# Patient Record
Sex: MALE | Race: WHITE
[De-identification: names, ages, dates, MRNs, and addresses within clinical notes are randomized per-mention and may not be internally consistent; named-entity substitution may affect disease eponyms.]

---

## 2019-06-28 ENCOUNTER — HOSPITAL ENCOUNTER (EMERGENCY)
Dept: HOSPITAL 63 - ER | Age: 34
Discharge: HOME | End: 2019-06-28
Payer: COMMERCIAL

## 2019-06-28 VITALS — HEIGHT: 71 IN | BODY MASS INDEX: 39.9 KG/M2 | WEIGHT: 285 LBS

## 2019-06-28 VITALS — SYSTOLIC BLOOD PRESSURE: 158 MMHG | DIASTOLIC BLOOD PRESSURE: 96 MMHG

## 2019-06-28 DIAGNOSIS — Z88.5: ICD-10-CM

## 2019-06-28 DIAGNOSIS — F43.9: ICD-10-CM

## 2019-06-28 DIAGNOSIS — F41.9: ICD-10-CM

## 2019-06-28 DIAGNOSIS — F12.10: ICD-10-CM

## 2019-06-28 DIAGNOSIS — R73.9: ICD-10-CM

## 2019-06-28 DIAGNOSIS — G45.9: Primary | ICD-10-CM

## 2019-06-28 LAB
ALBUMIN SERPL-MCNC: 4 G/DL (ref 3.4–5)
ALP SERPL-CCNC: 71 U/L (ref 46–116)
ALT SERPL-CCNC: 51 U/L (ref 16–63)
ANION GAP SERPL CALC-SCNC: 8 MMOL/L (ref 6–14)
AST SERPL-CCNC: 32 U/L (ref 15–37)
BASOPHILS # BLD AUTO: 0 X10^3/UL (ref 0–0.2)
BASOPHILS NFR BLD: 0 % (ref 0–3)
BILIRUB DIRECT SERPL-MCNC: 0.1 MG/DL (ref 0–0.2)
BILIRUB SERPL-MCNC: 0.7 MG/DL (ref 0.2–1)
CA-I SERPL ISE-MCNC: 14 MG/DL (ref 8–26)
CALCIUM SERPL-MCNC: 9.5 MG/DL (ref 8.5–10.1)
CHLORIDE SERPL-SCNC: 104 MMOL/L (ref 98–107)
CO2 SERPL-SCNC: 30 MMOL/L (ref 21–32)
CREAT SERPL-MCNC: 1.4 MG/DL (ref 0.7–1.3)
EOSINOPHIL NFR BLD: 0.1 X10^3/UL (ref 0–0.7)
EOSINOPHIL NFR BLD: 1 % (ref 0–3)
ERYTHROCYTE [DISTWIDTH] IN BLOOD BY AUTOMATED COUNT: 13.4 % (ref 11.5–14.5)
ERYTHROCYTE [SEDIMENTATION RATE] IN BLOOD: 2 MM/H (ref 0–15)
GFR SERPLBLD BASED ON 1.73 SQ M-ARVRAT: 58.4 ML/MIN
GLUCOSE SERPL-MCNC: 141 MG/DL (ref 70–99)
HCT VFR BLD CALC: 47.3 % (ref 39–53)
HGB BLD-MCNC: 16 G/DL (ref 13–17.5)
LYMPHOCYTES # BLD: 2.2 X10^3/UL (ref 1–4.8)
LYMPHOCYTES NFR BLD AUTO: 19 % (ref 24–48)
MCH RBC QN AUTO: 32 PG (ref 25–35)
MCHC RBC AUTO-ENTMCNC: 34 G/DL (ref 31–37)
MCV RBC AUTO: 95 FL (ref 79–100)
MONO #: 0.6 X10^3/UL (ref 0–1.1)
MONOCYTES NFR BLD: 5 % (ref 0–9)
NEUT #: 8.3 X10^3UL (ref 1.8–7.7)
NEUTROPHILS NFR BLD AUTO: 74 % (ref 31–73)
PLATELET # BLD AUTO: 325 X10^3/UL (ref 140–400)
POTASSIUM SERPL-SCNC: 3.8 MMOL/L (ref 3.5–5.1)
PROT SERPL-MCNC: 7 G/DL (ref 6.4–8.2)
RBC # BLD AUTO: 4.99 X10^6/UL (ref 4.3–5.7)
SODIUM SERPL-SCNC: 142 MMOL/L (ref 136–145)
WBC # BLD AUTO: 11.2 X10^3/UL (ref 4–11)

## 2019-06-28 PROCEDURE — 85651 RBC SED RATE NONAUTOMATED: CPT

## 2019-06-28 PROCEDURE — 80048 BASIC METABOLIC PNL TOTAL CA: CPT

## 2019-06-28 PROCEDURE — 36415 COLL VENOUS BLD VENIPUNCTURE: CPT

## 2019-06-28 PROCEDURE — 85730 THROMBOPLASTIN TIME PARTIAL: CPT

## 2019-06-28 PROCEDURE — 80076 HEPATIC FUNCTION PANEL: CPT

## 2019-06-28 PROCEDURE — 70450 CT HEAD/BRAIN W/O DYE: CPT

## 2019-06-28 PROCEDURE — 85025 COMPLETE CBC W/AUTO DIFF WBC: CPT

## 2019-06-28 PROCEDURE — 99285 EMERGENCY DEPT VISIT HI MDM: CPT

## 2019-06-28 PROCEDURE — 82947 ASSAY GLUCOSE BLOOD QUANT: CPT

## 2019-06-28 PROCEDURE — 93005 ELECTROCARDIOGRAM TRACING: CPT

## 2019-06-28 PROCEDURE — 85610 PROTHROMBIN TIME: CPT

## 2019-06-28 NOTE — ED.ADGEN
Past History


Past Medical History:  Anxiety


Drug Use:  Marijuana





Adult General


Chief Complaint


Chief Complaint


".. My entire Lt side of body is numb... My head all the way down.. ".. " It 

started at about 9 pm.. I did smoke some marijuana at 8:15.. It felt like I was 

having a panic attack... I am under stress.. going through a divorce..."..".. I 

got three kids.. We are all still living together..."





HPI


HPI





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 

complaints of entire Lt side weakness and numbness. Patient states his vision on

the left side is cloudy. Pt. States everything on Lt side head and body is numb 

and weak since 2100 hrs after smoking some marijuana.  Patient was however able 

to walk to get into his friend's car. In April to walk from the car to the 

emergency room.   Patient denies previous history of TIA or strokes. Does have a

history of anxiety disorder. Patient states he is under extreme amount of stress

because he is going through a divorce. Patient denies any illicit drug use other

than marijuana. Patient does smoke. No recent travel or specific ill contacts. 

No history of trauma. No history of HIV or immunosuppression. Patient is right-

hand dominant.





Review of Systems


Review of Systems





Constitutional: Denies fever or chills []


Eyes: Denies change in visual acuity, redness, or eye pain [] Decreased vision 

Lt eye


HENT: Denies nasal congestion or sore throat []


Respiratory: Denies cough or shortness of breath []


Cardiovascular: No additional information not addressed in HPI []


GI: Denies abdominal pain, nausea, vomiting, bloody stools or diarrhea []


: Denies dysuria or hematuria []


Musculoskeletal: Denies back pain or joint pain []


Integument: Denies rash or skin lesions []


Neurologic: Denies headache, focal weakness or sensory changes []Complaints of 

complete Lt side weakness and numbness. 





Endocrine: Denies polyuria or polydipsia []





All other systems were reviewed and found to be within normal limits, except as 

documented in this note.





Family History


Family History


Noncontributory





Current Medications


Current Medications





Current Medications








 Medications


  (Trade)  Dose


 Ordered  Sig/Mona  Start Time


 Stop Time Status Last Admin


Dose Admin


 


 Aspirin


  (Yeyo Aspirin)  325 mg  STK-MED ONCE  19 02:50


 19 04:24 DC  





 


 Lactated Ringer's  1,000 ml @ 


 1,000 mls/hr  1X  ONCE  19 03:30


 19 04:24 DC  














Allergies


Allergies





Allergies








Coded Allergies Type Severity Reaction Last Updated Verified


 


  oxycodone Allergy Unknown Itching 19 Yes











Physical Exam


Physical Exam





Constitutional: , in moderately acute  emotional distress, non-toxic appearance.

 []


HENT: Normocephalic, atraumatic, bilateral external ears normal, oropharynx 

moist, no oral exudates, nose normal. []Ear studs. 


Eyes: PERRLA, EOMI, conjunctiva mild injection, consensual light reflex,  no 

discharge. [] Glasses. 


Neck: Normal range of motion, no tenderness, supple, no stridor. [] 


Cardiovascular:Heart rate regular rhythm, no murmur []


Lungs & Thorax:  Bilateral breath sounds equal at apexes and a few scattered 

wheezes on auscultation []


Abdomen: Bowel sounds normal, soft, no tenderness, no masses, no pulsatile 

masses. [] Obese. 


Skin: Warm, dry, no erythema, no rash. [] Tattoo's. 


Back: No tenderness, no CVA tenderness. [] 


Extremities: No tenderness, no cyanosis, no clubbing, ROM intact, no edema. [] 


Neurologic: Alert and oriented X 3, complains of weakness and numbness on his 

entire left side of body , DTRs +2 patella and brachial.   Distal vib. 128 

intact.  Rt. hand dominate.


Psychologic: Affect anxious, judgement normal, mood normal. []





Current Patient Data


Lab Results





                                Laboratory Tests








Test


 19


01:25 19


01:50


 


Glucose (Fingerstick)


 134 mg/dL


(70-99)  H 





 


White Blood Count


 


 11.2 x10^3/uL


(4.0-11.0)  H


 


Red Blood Count


 


 4.99 x10^6/uL


(4.30-5.70)


 


Hemoglobin


 


 16.0 g/dL


(13.0-17.5)


 


Hematocrit


 


 47.3 %


(39.0-53.0)


 


Mean Corpuscular Volume


 


 95 fL ()





 


Mean Corpuscular Hemoglobin  32 pg (25-35)  


 


Mean Corpuscular Hemoglobin


Concent 


 34 g/dL


(31-37)


 


Red Cell Distribution Width


 


 13.4 %


(11.5-14.5)


 


Platelet Count


 


 325 x10^3/uL


(140-400)


 


Neutrophils (%) (Auto)  74 % (31-73)  H


 


Lymphocytes (%) (Auto)  19 % (24-48)  L


 


Monocytes (%) (Auto)  5 % (0-9)  


 


Eosinophils (%) (Auto)  1 % (0-3)  


 


Basophils (%) (Auto)  0 % (0-3)  


 


Neutrophils # (Auto)


 


 8.3 x10^3uL


(1.8-7.7)  H


 


Lymphocytes # (Auto)


 


 2.2 x10^3/uL


(1.0-4.8)


 


Monocytes # (Auto)


 


 0.6 x10^3/uL


(0.0-1.1)


 


Eosinophils # (Auto)


 


 0.1 x10^3/uL


(0.0-0.7)


 


Basophils # (Auto)


 


 0.0 x10^3/uL


(0.0-0.2)


 


Erythrocyte Sedimentation Rate  2 (0-15)  


 


Prothrombin Time


 


 9.9 SEC


(9.4-11.4)


 


Prothrombin Time INR  1.0 (0.9-1.1)  


 


PTT


 


 29 SEC (23-33)





 


Sodium Level


 


 142 mmol/L


(136-145)


 


Potassium Level


 


 3.8 mmol/L


(3.5-5.1)


 


Chloride Level


 


 104 mmol/L


()


 


Carbon Dioxide Level


 


 30 mmol/L


(21-32)


 


Anion Gap  8 (6-14)  


 


Blood Urea Nitrogen


 


 14 mg/dL


(8-26)


 


Creatinine


 


 1.4 mg/dL


(0.7-1.3)  H


 


Estimated GFR


(Cockcroft-Gault) 


 58.4  





 


Glucose Level


 


 141 mg/dL


(70-99)  H


 


Calcium Level


 


 9.5 mg/dL


(8.5-10.1)


 


Total Bilirubin


 


 0.7 mg/dL


(0.2-1.0)


 


Direct Bilirubin


 


 0.1 mg/dL


(0.0-0.2)


 


Aspartate Amino Transferase


(AST) 


 32 U/L (15-37)





 


Alanine Aminotransferase (ALT)


 


 51 U/L (16-63)





 


Alkaline Phosphatase


 


 71 U/L


()


 


Total Protein


 


 7.0 g/dL


(6.4-8.2)


 


Albumin


 


 4.0 g/dL


(3.4-5.0)











EKG


EKG


My interpretation EKG shows sinus rhythm at 85 bpm. No findings acute STEMI with

 contralateral changes.[]





Radiology/Procedures


Radiology/Procedures


[[phone]


                                        


                                 IMAGING REPORT





                                     Signed





PATIENT: ANALILIA SAUNDERS   ACCOUNT: [id number]     MRN#: [id number]


: 1985           LOCATION: ER              AGE: 33


SEX: M                    EXAM DT: 19         ACCESSION#: [id number]


STATUS: PRE ER            ORD. PHYSICIAN: NATHALIE CORONADO MD   


REASON: LEFT SIDED FACIAL NUMBNESS/WEAKNESS


PROCEDURE: CT CODE STROKE HEAD WO





PQRS Compliance Statement:


 


One or more of the following individualized dose reduction techniques were


utilized for this examination:  


1. Automated exposure control  


2. Adjustment of the mA and/or kV according to patient size  


3. Use of iterative reconstruction technique


 


 


CT head without contrast 2019 1:34 AM


 


INDICATION: Code stroke


 


COMPARISON: None available


 


TECHNIQUE: Multiple axial CT images of the head were obtained from skull 


base through the vertex without intravenous contrast. 


 


FINDINGS:


 


Head:


 


Ventricles, sulci and basal cisterns are within normal limits. There is no


hydrocephalus. Gray-white matter differentiation is normal. There is no 


acute intracranial hemorrhage. There is no mass, mass effect or midline 


shift. Posterior fossa is normal in appearance. Mild crowding of the 


foramen magnum by the cerebellar tonsils.


 


Visualized portions of the orbits are normal. Paranasal sinuses are well 


aerated. Mastoid air cells are well aerated. Scalp and calvaria are 


normal.


 


 


IMPRESSION:


 


No acute intracranial hemorrhage.


 


Mild crowding of the foramen magnum by the cerebellar tonsils.


 


Critical result:


 


Findings discussed with NATHALIE CORONADO at 2019 1:48 AM.


 


**********FOR INTERNAL CODING PURPOSES**********


 


 


RESULT CODE: (C)  


 


 


 


 


 


 


 


Electronically signed by: Rk Pepe MD (2019 1:50 AM) 


[facility]














DICTATED AND SIGNED BY:     RK PEPE MD


DATE:     19 0150





CC: NATHALIE CORONADO MD; CHANI RUGGIERO ~





Course & Med Decision Making


Course & Med Decision Making


Pertinent Labs and Imaging studies reviewed. (See chart for details)





Atypical TIA or CVA presentation.





Discussed with pt. risks and benefit of TPA.  Shortly thereafter reported 

clearing of all his symptoms of Lt. side numbness and weakness.  Pt. declined 

TPA at this time. 





Pt. unable to urinate during ED visit.  





Pt. at 3:30 demanding discharge.  Advised he had to be at work at 0800 hrs .  

Review risks of discharge.  Pt. exhibits [employer] capacity.  Advised he must 

followup.   Take a daily ASA.   Return at any time if he wished to complete his 

work up. 








[]





Final Impression


Final Impression


1. Lt side numbness-Head to Toe[]


2. Elevated Glucose


3. Anxiety and Stress





Dragon Disclaimer


Dragon Disclaimer


This electronic medical record was generated, in whole or in part, using a voice

 recognition dictation system.





Discharge Summary


Visit Information


Final Diagnosis


Problems


Medical Problems:


(1) TIA (transient ischemic attack)


Status: Acute  











Brief Hospital Course


Allergies





                                    Allergies








Coded Allergies Type Severity Reaction Last Updated Verified


 


  oxycodone Allergy Unknown Itching 19 Yes








Lab Results





Laboratory Tests








Test


 19


01:25 19


01:50


 


Glucose (Fingerstick)


 134 mg/dL


(70-99) 





 


White Blood Count


 


 11.2 x10^3/uL


(4.0-11.0)


 


Red Blood Count


 


 4.99 x10^6/uL


(4.30-5.70)


 


Hemoglobin


 


 16.0 g/dL


(13.0-17.5)


 


Hematocrit


 


 47.3 %


(39.0-53.0)


 


Mean Corpuscular Volume  95 fL () 


 


Mean Corpuscular Hemoglobin  32 pg (25-35) 


 


Mean Corpuscular Hemoglobin


Concent 


 34 g/dL


(31-37)


 


Red Cell Distribution Width


 


 13.4 %


(11.5-14.5)


 


Platelet Count


 


 325 x10^3/uL


(140-400)


 


Neutrophils (%) (Auto)  74 % (31-73) 


 


Lymphocytes (%) (Auto)  19 % (24-48) 


 


Monocytes (%) (Auto)  5 % (0-9) 


 


Eosinophils (%) (Auto)  1 % (0-3) 


 


Basophils (%) (Auto)  0 % (0-3) 


 


Neutrophils # (Auto)


 


 8.3 x10^3uL


(1.8-7.7)


 


Lymphocytes # (Auto)


 


 2.2 x10^3/uL


(1.0-4.8)


 


Monocytes # (Auto)


 


 0.6 x10^3/uL


(0.0-1.1)


 


Eosinophils # (Auto)


 


 0.1 x10^3/uL


(0.0-0.7)


 


Basophils # (Auto)


 


 0.0 x10^3/uL


(0.0-0.2)


 


Erythrocyte Sedimentation Rate  2 (0-15) 


 


Prothrombin Time


 


 9.9 SEC


(9.4-11.4)


 


Prothromb Time International


Ratio 


 1.0 (0.9-1.1) 





 


Activated Partial


Thromboplast Time 


 29 SEC (23-33) 





 


Sodium Level


 


 142 mmol/L


(136-145)


 


Potassium Level


 


 3.8 mmol/L


(3.5-5.1)


 


Chloride Level


 


 104 mmol/L


()


 


Carbon Dioxide Level


 


 30 mmol/L


(21-32)


 


Anion Gap  8 (6-14) 


 


Blood Urea Nitrogen


 


 14 mg/dL


(8-26)


 


Creatinine


 


 1.4 mg/dL


(0.7-1.3)


 


Estimated GFR


(Cockcroft-Gault) 


 58.4 





 


Glucose Level


 


 141 mg/dL


(70-99)


 


Calcium Level


 


 9.5 mg/dL


(8.5-10.1)


 


Total Bilirubin


 


 0.7 mg/dL


(0.2-1.0)


 


Direct Bilirubin


 


 0.1 mg/dL


(0.0-0.2)


 


Aspartate Amino Transf


(AST/SGOT) 


 32 U/L (15-37) 





 


Alanine Aminotransferase


(ALT/SGPT) 


 51 U/L (16-63) 





 


Alkaline Phosphatase


 


 71 U/L


()


 


Total Protein


 


 7.0 g/dL


(6.4-8.2)


 


Albumin


 


 4.0 g/dL


(3.4-5.0)








Brief Hospital Course


Mr. Saunders  is a 33 old male who presented with complete Lt side weakness and 

numbness.   Resolved during ED stay.  Declined admission.





Discharge Information


Condition at Discharge:  Improved, Stable


Disposition/Orders:  D/C to Home


Dischare Medications





Current Medications


Lactated Ringer's 1,000 ml @  0 mls/hr 1X  ONCE IV  Last administered on 

19at 02:55; Admin Dose 1,000 MLS/HR;  Start 19 at 01:45;  Stop 19

 at 04:00;  Status DC


Lactated Ringer's 1,000 ml @  0 mls/hr 1X  ONCE IV ;  Start 19 at 04:00;  

Stop 19 at 04:01;  Status DC


Aspirin (HumanCloud Aspirin) 325 mg 1X  ONCE PO  Last administered on 19at 

02:56; Admin Dose 325 MG;  Start 19 at 02:15;  Stop 19 at 04:24;  

Status DC


Lactated Ringer's 1,000 ml @  1,000 mls/hr 1X  ONCE IV ;  Start 19 at 

03:30;  Stop 19 at 04:24;  Status DC


Aspirin (Yeyo Aspirin) 325 mg STK-MED ONCE .ROUTE ;  Start 19 at 02:50;  

Stop 19 at 04:24;  Status DC








Dragon Disclaimer


This chart was dictated in whole or in part using Voice Recognition software in 

a busy, high-work load, and often noisy Emergency Department environment.  It 

may contain unintended and wholly unrecognized errors or omissions.











NATHALIE CORONADO MD           2019 01:29

## 2019-06-28 NOTE — RAD
RS Compliance Statement:

 

One or more of the following individualized dose reduction techniques were

utilized for this examination:  

1. Automated exposure control  

2. Adjustment of the mA and/or kV according to patient size  

3. Use of iterative reconstruction technique

 

 

CT head without contrast 6/28/2019 1:34 AM

 

INDICATION: Code stroke

 

COMPARISON: None available

 

TECHNIQUE: Multiple axial CT images of the head were obtained from skull 

base through the vertex without intravenous contrast. 

 

FINDINGS:

 

Head:

 

Ventricles, sulci and basal cisterns are within normal limits. There is no

hydrocephalus. Gray-white matter differentiation is normal. There is no 

acute intracranial hemorrhage. There is no mass, mass effect or midline 

shift. Posterior fossa is normal in appearance. Mild crowding of the 

foramen magnum by the cerebellar tonsils.

 

Visualized portions of the orbits are normal. Paranasal sinuses are well 

aerated. Mastoid air cells are well aerated. Scalp and calvaria are 

normal.

 

 

IMPRESSION:

 

No acute intracranial hemorrhage.

 

Mild crowding of the foramen magnum by the cerebellar tonsils.

 

Critical result:

 

Findings discussed with NATHALIE CORONADO at 6/28/2019 1:48 AM.

 

**********FOR INTERNAL CODING PURPOSES**********

 

 

RESULT CODE: (C)  

 

 

 

 

 

 

 

Electronically signed by: Margarita Pond MD (6/28/2019 1:50 AM) 

[facility]

## 2019-07-01 NOTE — EKG
[facility] [address], [place] [address]

Test Date:    2019               Test Time:    01:46:55

Pat Name:     ANALILIA SAUNDERS          Department:   

Patient ID:   [id number]           Room:          

Gender:       M                        Technician:   SHAUNNA

:          1985               Requested By: NATHALIE CORONADO

Order Number: [id number]            Reading MD:     

                                 Measurements

Intervals                              Axis          

Rate:         85                       P:            64

MA:           180                      QRS:          62

QRSD:         104                      T:            65

QT:           368                                    

QTc:          438                                    

                           Interpretive Statements

SINUS RHYTHM

QRS(T) CONTOUR ABNORMALITY

CONSIDER INFERIOR MYOCARDIAL DAMAGE

POSSIBLY ABNORMAL ECG

RI6.01

No previous ECG available for comparison